# Patient Record
(demographics unavailable — no encounter records)

---

## 2024-12-27 NOTE — IMAGING
[de-identified] : Spine: Inspection/Palpation: No tenderness to palpation throughout Cervical/thoracic/lumbar spine. No bony stepoffs, No lesions.  Gait: antalgic, able to perform bilateral toe and heel rise.     Neurologic:  Bilateral Lower Extremities 5/5 Iliopsoas/Quadriceps/Hamstrings/ Tibialis Anterior/ Gastrocnemius. Extensor Hallucis Longus/ Flexor Hallucis Longus except    Sensation intact to light touch L2-S1  ct T and L spine with L3 T5 and T8 VCF. multilevel degen changes.

## 2024-12-27 NOTE — HISTORY OF PRESENT ILLNESS
[Mid-back] : mid-back [Lower back] : lower back [Left Leg] : left leg [Right Leg] : right leg [Sudden] : sudden [10] : 10 [Radiating] : radiating [Sharp] : sharp [Constant] : constant [Household chores] : household chores [Leisure] : leisure [Sleep] : sleep [Walking] : walking [Retired] : Work status: retired [de-identified] : 12/27/2024 Patient complains of sharp Mid and Lower Back pain since 12/14/2024. No injury or trauma, Patient went to  and was sent to [facility] for Thoracic and Lumbar CT. FX T5 T8 and Acute FX L3. Radiating pain pain in B Legs  [] : Post Surgical Visit: no [FreeTextEntry7] : B Legs  [de-identified] : 12/20/2024  [de-identified] : CT Scans  [de-identified] : [facility]

## 2024-12-27 NOTE — ASSESSMENT
[FreeTextEntry1] : 88 F with vertebral compression fractures Acute L3 likely chronic T5 and T8. Bracing tramadol FU 2 weeks repeat x-ray ap/lateral thoracic and lumbar

## 2025-01-10 NOTE — ASSESSMENT
[FreeTextEntry1] : 88 F with vertebral compression fractures Acute L3 likely chronic T5 and T8. Bracing Pain improved not taking meds C/w brace FU 4 weeks  consider PT at next visit X-rays apo/lateral lumbar spine at next visit.

## 2025-01-10 NOTE — IMAGING
[de-identified] : Spine: Inspection/Palpation: No tenderness to palpation throughout Cervical/thoracic/lumbar spine. No bony stepoffs, No lesions.  Gait: antalgic, able to perform bilateral toe and heel rise.     Neurologic:  Bilateral Lower Extremities 5/5 Iliopsoas/Quadriceps/Hamstrings/ Tibialis Anterior/ Gastrocnemius. Extensor Hallucis Longus/ Flexor Hallucis Longus except    Sensation intact to light touch L2-S1  ct T and L spine with L3 T5 and T8 VCF. multilevel degen changes.

## 2025-01-10 NOTE — HISTORY OF PRESENT ILLNESS
[Mid-back] : mid-back [Lower back] : lower back [Left Leg] : left leg [Right Leg] : right leg [Sudden] : sudden [5] : 5 [4] : 4 [Radiating] : radiating [Constant] : constant [Household chores] : household chores [Leisure] : leisure [Sleep] : sleep [Walking] : walking [Retired] : Work status: retired [de-identified] : 1/10/2025 Decreased Mid LBP   12/27/2024 Patient complains of sharp Mid and Lower Back pain since 12/14/2024. No injury or trauma, Patient went to  and was sent to [facility] for [facility] and Lumbar CT. FX T5 T8 and Acute FX L3. Radiating pain pain in B Legs  [] : Post Surgical Visit: no [FreeTextEntry7] : B Legs  [de-identified] : 12/27/2024  [de-identified] : Dr. Armstrong [de-identified] : CT Scans

## 2025-02-14 NOTE — IMAGING
[de-identified] : Spine: Inspection/Palpation: No tenderness to palpation throughout Cervical/thoracic/lumbar spine. No bony stepoffs, No lesions.  Gait: antalgic, able to perform bilateral toe and heel rise.     Neurologic:  Bilateral Lower Extremities 5/5 Iliopsoas/Quadriceps/Hamstrings/ Tibialis Anterior/ Gastrocnemius. Extensor Hallucis Longus/ Flexor Hallucis Longus except    Sensation intact to light touch L2-S1  ct T and L spine with L3 T5 and T8 VCF. multilevel degen changes.   X-ray Ap/Lateral of lumbar spine were viewed and interpreted.  stbael L3VCF

## 2025-02-14 NOTE — HISTORY OF PRESENT ILLNESS
[Mid-back] : mid-back [Lower back] : lower back [Left Leg] : left leg [Right Leg] : right leg [Sudden] : sudden [5] : 5 [4] : 4 [Radiating] : radiating [Constant] : constant [Household chores] : household chores [Leisure] : leisure [Sleep] : sleep [Walking] : walking [Retired] : Work status: retired [de-identified] : 2/14/2025 Decreased Mid LBP  1/10/2025 Decreased Mid LBP   12/27/2024 Patient complains of sharp Mid and Lower Back pain since 12/14/2024. No injury or trauma, Patient went to  and was sent to [facility] for [facility] and Lumbar CT. FX T5 T8 and Acute FX L3. Radiating pain pain in B Legs  [] : Post Surgical Visit: no [FreeTextEntry7] : B Legs  [de-identified] : 1/10/2025 [de-identified] : Dr. Armstrong [de-identified] : CT Scans

## 2025-02-14 NOTE — ASSESSMENT
[FreeTextEntry1] : 88 F with vertebral compression fractures Acute L3 likely chronic T5 and T8. Pain improved Discussed PT patient deferred FU PRN  wean bracing

## 2025-04-02 NOTE — HISTORY OF PRESENT ILLNESS
[FreeTextEntry1] : 87 yo womna with history of breast Ca 2001( XRT , lumpectomy) , HTN, HLD, presents for osteoporosis management   Patient in he past was on Fosamax for multiple year (possible 7 years)  about 1.5 years ago she had a VB fracture after a fall and now in Dec 2024 she had a spontaneous VB fracture  she is followed by ortho and was sent to me for management    no parental hip fractures  NO smoking, alcohol  NO prednisone  NO endo disorders No malabsorption issues  menses at 15 and menopause 50   taking vIt D and calcium walks  for excercise  DEXA 2019 Lumbar -2.2 LFN -2.2 RFN -2.2  DEXA 2011 lumbar -2.6 LFN -2.3  PMH: as  above  Surgery:  Allergy: NKDA  MEDs: will bring in bottles on next visit  FH: [place] SH: From [place],  lives with  , works  retired ,  no alcohol/smoking or illicit drugs

## 2025-04-02 NOTE — PHYSICAL EXAM
[General Appearance - Well Nourished] : well nourished [General Appearance - Well Developed] : well developed [Sclera] : the sclera and conjunctiva were normal [Hearing Threshold Finger Rub Not [place]] : hearing was normal [Nail Clubbing] : no clubbing  or cyanosis of the fingernails [Musculoskeletal - Swelling] : no joint swelling seen [Motor Tone] : muscle strength and tone were normal [] : no rash [Skin Lesions] : no skin lesions [Affect] : the affect was normal [Mood] : the mood was normal [FreeTextEntry1] : + kyphosis, + scoliosis

## 2025-04-02 NOTE — REVIEW OF SYSTEMS
[Constipation] : constipation [Fever] : no fever [Chills] : no chills [Recent Weight Gain (___ Lbs)] : no recent weight gain [Eye Pain] : no eye pain [Red Eyes] : eyes not red [Nosebleeds] : no nosebleeds [Nasal Discharge] : no nasal discharge [Chest Pain] : no chest pain [Palpitations] : no palpitations [Cough] : no cough [SOB on Exertion] : no shortness of breath during exertion [Diarrhea] : no diarrhea

## 2025-04-21 NOTE — PROCEDURE
[Today's Date:] : Date: [unfilled] [Patient] : the patient [Consent Obtained] : written consent was obtained prior to the procedure and is detailed in the patient's record [Therapeutic] : therapeutic [#1 Site: ______] : #1 site identified in the [unfilled] [Alcohol] : alcohol [___ml 1% Lidocaine] : [unfilled] ml of 1% lidocaine [Depomedrol ___ mg] : Depomedrol [unfilled] mg [Tolerated Well] : the patient tolerated the procedure well [No Complications] : there were no complications [Instructions Given] : handouts/patient instructions were given to patient [Patient Instructed to Call] : patient was instructed to call if redness at site, a decrease in range of motion or an increase in pain is noted after procedure. [de-identified] : prolia syringe

## 2025-04-21 NOTE — HISTORY OF PRESENT ILLNESS
[FreeTextEntry1] : 87 yo womna with history of breast Ca 2001( XRT , lumpectomy) , HTN, HLD, presents for osteoporosis management   Patient in he past was on Fosamax for multiple year (possible 7 years)  about 1.5 years ago she had a VB fracture after a fall and now in Dec 2024 she had a spontaneous VB fracture  she is followed by ortho and was sent to me for management  NO treatment in multiple years    no parental hip fractures  NO smoking, alcohol  NO prednisone  NO endo disorders No malabsorption issues  menses at 15 and menopause 50   taking vIt D and calcium walks  for excercise  DEXA 4/2025:  lumbar -2.6 ( [place])  LFN -2.2 TH -2.0  DEXA 2019 Lumbar -2.2 LFN -2.2 RFN -2.2  DEXA 2011 lumbar -2.6 LFN -2.3  Labs: normal Vit D, TSH, PTH, mg, phos, electrophoresis   PMH: as  above  Surgery:  Allergy: NKDA  MEDs: will bring in bottles on next visit  FH: [place] SH: From [place], lives with , works retired ,  no alcohol/smoking or illicit drugs

## 2025-04-21 NOTE — ASSESSMENT
[FreeTextEntry1] : 87 yo woman with history of HTN, HLD, braest Ca 2002 ( S/p XRT and lumpectomy) presents for osteoporosis management.  Patient in the past treated with fosamax for about 7 years.  she has multiple VB fractures.  New dexa 4/2025 shows mildly wosening while off therapy   --prolia given today  --OP labs prior to next visit  --cont vit d 1000mg daily and calcium 1200mg daily --encourage excercise -walking and mild wts bearing excercise

## 2025-04-21 NOTE — PHYSICAL EXAM
[General Appearance - Well Nourished] : well nourished [General Appearance - Well Developed] : well developed [Sclera] : the sclera and conjunctiva were normal [Hearing Threshold Finger Rub Not [place]] : hearing was normal [Nail Clubbing] : no clubbing  or cyanosis of the fingernails [Musculoskeletal - Swelling] : no joint swelling seen [Motor Tone] : muscle strength and tone were normal [] : no rash [Skin Lesions] : no skin lesions [Affect] : the affect was normal [Mood] : the mood was normal [FreeTextEntry1] : + kyphosis, + scoliosis